# Patient Record
Sex: FEMALE | Race: WHITE | NOT HISPANIC OR LATINO | URBAN - METROPOLITAN AREA
[De-identification: names, ages, dates, MRNs, and addresses within clinical notes are randomized per-mention and may not be internally consistent; named-entity substitution may affect disease eponyms.]

---

## 2022-07-09 ENCOUNTER — TELEPHONE ENCOUNTER (OUTPATIENT)
Dept: URBAN - METROPOLITAN AREA CLINIC 121 | Facility: CLINIC | Age: 47
End: 2022-07-09

## 2022-07-09 RX ORDER — PANTOPRAZOLE SODIUM 40 MG/1
GRANULE, DELAYED RELEASE ORAL TWICE A DAY
Refills: 0 | OUTPATIENT
Start: 2015-05-07 | End: 2015-06-24

## 2022-07-09 RX ORDER — METRONIDAZOLE 500 MG/1
TAKE ONE TABLET PO THREE TIMES A DAY FOR 10 DAYS TABLET ORAL THREE TIMES A DAY
Refills: 0 | OUTPATIENT
Start: 2015-06-01 | End: 2015-06-24

## 2022-07-09 RX ORDER — LEVOTHYROXINE SODIUM 100 UG/1
TABLET ORAL ONCE A DAY
Refills: 0 | OUTPATIENT
Start: 2014-09-24 | End: 2015-05-07

## 2022-07-09 RX ORDER — TOPIRAMATE 25 MG
TABLET ORAL
Refills: 0 | OUTPATIENT
Start: 2015-02-26 | End: 2015-05-07

## 2022-07-09 RX ORDER — DEXTROAMPHETAMINE SACCHARATE, AMPHETAMINE ASPARTATE, DEXTROAMPHETAMINE SULFATE, AND AMPHETAMINE SULFATE 3.125; 3.125; 3.125; 3.125 MG/1; MG/1; MG/1; MG/1
TABLET ORAL ONCE A DAY
Refills: 0 | OUTPATIENT
Start: 2015-05-07 | End: 2015-05-20

## 2022-07-09 RX ORDER — LEVOTHYROXINE SODIUM 112 UG/1
TABLET ORAL ONCE A DAY
Refills: 0 | OUTPATIENT
Start: 2015-05-07 | End: 2015-06-24

## 2022-07-09 RX ORDER — DEXTROAMPHETAMINE SACCHARATE, AMPHETAMINE ASPARTATE, DEXTROAMPHETAMINE SULFATE, AND AMPHETAMINE SULFATE 7.5; 7.5; 7.5; 7.5 MG/1; MG/1; MG/1; MG/1
TABLET ORAL
Refills: 0 | OUTPATIENT
Start: 2015-02-26 | End: 2015-05-07

## 2022-07-09 RX ORDER — DEXTROAMPHETAMINE SULFATE, DEXTROAMPHETAMINE SACCHARATE, AMPHETAMINE SULFATE AND AMPHETAMINE ASPARTATE 2.5; 2.5; 2.5; 2.5 MG/1; MG/1; MG/1; MG/1
CAPSULE, EXTENDED RELEASE ORAL ONCE A DAY
Refills: 0 | OUTPATIENT
Start: 2015-05-20 | End: 2015-06-24

## 2022-07-09 RX ORDER — DICYCLOMINE HYDROCHLORIDE 20 MG/1
TAKE ONE TABLET PO TID TABLET ORAL THREE TIMES A DAY
Refills: 0 | OUTPATIENT
Start: 2015-05-07 | End: 2015-06-03

## 2022-07-09 RX ORDER — OMEPRAZOLE 40 MG/1
CAPSULE, DELAYED RELEASE ORAL ONCE A DAY
Refills: 0 | OUTPATIENT
Start: 2014-09-24 | End: 2015-05-07

## 2022-07-09 RX ORDER — ATORVASTATIN CALCIUM 10 MG/1
TABLET, FILM COATED ORAL ONCE A DAY
Refills: 0 | OUTPATIENT
Start: 2015-06-11 | End: 2015-06-24

## 2022-07-09 RX ORDER — OMEPRAZOLE 40 MG/1
TAKE ONE CAPSULE PO DAILY 30-60 MINUTES BEFORE BREAKFAST CAPSULE, DELAYED RELEASE ORAL TWICE A DAY
Refills: 0 | OUTPATIENT
Start: 2014-09-24 | End: 2014-10-15

## 2022-07-09 RX ORDER — PHENTERMINE HYDROCHLORIDE 37.5 MG/1
CAPSULE ORAL ONCE A DAY
Refills: 0 | OUTPATIENT
Start: 2014-09-24 | End: 2015-02-26

## 2022-07-10 ENCOUNTER — TELEPHONE ENCOUNTER (OUTPATIENT)
Dept: URBAN - METROPOLITAN AREA CLINIC 121 | Facility: CLINIC | Age: 47
End: 2022-07-10

## 2022-07-10 RX ORDER — PANTOPRAZOLE SODIUM 40 MG/1
GRANULE, DELAYED RELEASE ORAL TWICE A DAY
Refills: 0 | Status: ACTIVE | COMMUNITY
Start: 2015-06-24

## 2022-07-10 RX ORDER — ATORVASTATIN CALCIUM 10 MG/1
TABLET, FILM COATED ORAL ONCE A DAY
Refills: 0 | Status: ACTIVE | COMMUNITY
Start: 2015-06-24

## 2022-07-10 RX ORDER — DICYCLOMINE HYDROCHLORIDE 20 MG/1
THREE TIMES A DAY TABLET ORAL THREE TIMES A DAY
Refills: 3 | Status: ACTIVE | COMMUNITY
Start: 2015-06-03

## 2022-07-10 RX ORDER — DEXTROAMPHETAMINE SULFATE, DEXTROAMPHETAMINE SACCHARATE, AMPHETAMINE SULFATE AND AMPHETAMINE ASPARTATE 2.5; 2.5; 2.5; 2.5 MG/1; MG/1; MG/1; MG/1
CAPSULE, EXTENDED RELEASE ORAL ONCE A DAY
Refills: 0 | Status: ACTIVE | COMMUNITY
Start: 2015-06-24

## 2022-07-10 RX ORDER — LEVOTHYROXINE SODIUM 112 UG/1
TABLET ORAL ONCE A DAY
Refills: 0 | Status: ACTIVE | COMMUNITY
Start: 2015-06-24

## 2022-07-10 RX ORDER — SUCRALFATE 1 G/1
TAKE ONE TAB PO FOUR TIMES DAILY TABLET ORAL
Refills: 0 | Status: ACTIVE | COMMUNITY
Start: 2014-09-24

## 2022-07-10 RX ORDER — OMEPRAZOLE 40 MG/1
TAKE ONE CAPSULE BY MOUTH TWICE A DAY TAKE ONE 30 TO 60 MINUTES PRIOR TO BREAKFAST CAPSULE, DELAYED RELEASE ORAL TWICE A DAY
Refills: 1 | Status: ACTIVE | COMMUNITY
Start: 2014-10-28

## 2023-05-16 ENCOUNTER — OFFICE VISIT (OUTPATIENT)
Dept: URBAN - METROPOLITAN AREA CLINIC 60 | Facility: CLINIC | Age: 48
End: 2023-05-16

## 2023-05-21 PROBLEM — 235595009: Status: ACTIVE | Noted: 2023-05-21

## 2023-05-21 PROBLEM — 397881000: Status: ACTIVE | Noted: 2023-05-21

## 2023-05-22 ENCOUNTER — OFFICE VISIT (OUTPATIENT)
Dept: URBAN - METROPOLITAN AREA CLINIC 60 | Facility: CLINIC | Age: 48
End: 2023-05-22
Payer: COMMERCIAL

## 2023-05-22 ENCOUNTER — LAB OUTSIDE AN ENCOUNTER (OUTPATIENT)
Dept: URBAN - METROPOLITAN AREA CLINIC 60 | Facility: CLINIC | Age: 48
End: 2023-05-22

## 2023-05-22 ENCOUNTER — WEB ENCOUNTER (OUTPATIENT)
Dept: URBAN - METROPOLITAN AREA CLINIC 60 | Facility: CLINIC | Age: 48
End: 2023-05-22

## 2023-05-22 VITALS
DIASTOLIC BLOOD PRESSURE: 84 MMHG | SYSTOLIC BLOOD PRESSURE: 122 MMHG | OXYGEN SATURATION: 98 % | HEART RATE: 88 BPM | HEIGHT: 61 IN | TEMPERATURE: 97.6 F | BODY MASS INDEX: 30.02 KG/M2 | WEIGHT: 159 LBS

## 2023-05-22 DIAGNOSIS — R49.0 HOARSENESS: ICD-10-CM

## 2023-05-22 DIAGNOSIS — K57.90 DIVERTICULOSIS: ICD-10-CM

## 2023-05-22 DIAGNOSIS — K21.9 GASTROESOPHAGEAL REFLUX DISEASE, UNSPECIFIED WHETHER ESOPHAGITIS PRESENT: ICD-10-CM

## 2023-05-22 DIAGNOSIS — R10.32 LEFT LOWER QUADRANT ABDOMINAL PAIN: ICD-10-CM

## 2023-05-22 PROBLEM — 301716002: Status: ACTIVE | Noted: 2023-05-22

## 2023-05-22 PROBLEM — 50219008: Status: ACTIVE | Noted: 2023-05-22

## 2023-05-22 PROCEDURE — 99204 OFFICE O/P NEW MOD 45 MIN: CPT | Performed by: PHYSICIAN ASSISTANT

## 2023-05-22 RX ORDER — SPIRONOLACTONE 25 MG/1
TABLET ORAL
Qty: 90 TABLET | Status: ACTIVE | COMMUNITY

## 2023-05-22 RX ORDER — SEMAGLUTIDE 1.34 MG/ML
INJECTION, SOLUTION SUBCUTANEOUS
Qty: 3 MILLILITER | Status: ACTIVE | COMMUNITY

## 2023-05-22 RX ORDER — LIOTHYRONINE SODIUM 5 UG/1
TAKE 1 TABLET BY MOUTH EVERY DAY TABLET ORAL
Qty: 90 EACH | Refills: 1 | Status: ACTIVE | COMMUNITY

## 2023-05-22 RX ORDER — TOPIRAMATE 100 MG/1
TABLET, FILM COATED ORAL
Qty: 90 TABLET | Status: ACTIVE | COMMUNITY

## 2023-05-22 RX ORDER — DEXLANSOPRAZOLE 30 MG/1
TAKE 1 CAPSULE BY MOUTH EVERY DAY CAPSULE, DELAYED RELEASE ORAL
Qty: 30 EACH | Refills: 1 | Status: ACTIVE | COMMUNITY

## 2023-05-22 RX ORDER — MECLIZINE HYDROCHLORIDE 25 MG/1
TAKE 1 TABLET BY MOUTH THREE TIMES A DAY FOR 10 DAYS TABLET ORAL
Qty: 30 EACH | Refills: 0 | Status: ACTIVE | COMMUNITY

## 2023-05-22 RX ORDER — LEVOTHYROXINE SODIUM 112 UG/1
TABLET ORAL ONCE A DAY
Refills: 0 | Status: ACTIVE | COMMUNITY
Start: 2015-06-24

## 2023-05-22 RX ORDER — VALACYCLOVIR HYDROCHLORIDE 1 G/1
TABLET, FILM COATED ORAL
Qty: 20 TABLET | Status: ACTIVE | COMMUNITY

## 2023-05-22 RX ORDER — ALBUTEROL SULFATE 90 UG/1
INHALE 2 PUFFS EVERY 4 HOURS BY INHALATION ROUTE FOR 14 DAYS AEROSOL, METERED RESPIRATORY (INHALATION)
Qty: 18 GRAM | Refills: 0 | Status: ACTIVE | COMMUNITY

## 2023-05-22 RX ORDER — IBUPROFEN 800 MG/1
TABLET, FILM COATED ORAL
Qty: 30 TABLET | Status: ACTIVE | COMMUNITY

## 2023-05-22 NOTE — HPI-TODAY'S VISIT:
Niurka is a 48-year-old female who presents for evaluation of diverticulitis. Previous office visit with Kinga Lantigua on 6/24/2015 for complaint of epigastric pain.   ------Recent CT scan of the abdomen and pelvis with contrast performed on 4/6/2023 and compared to previous exam on 3/28/2022 showed diverticulosis of the sigmoid colon without diverticulitis, 4 x 4.5 cm fibroid nodule of the uterus, residual 1.5 cm left ovarian cyst.     ------Upper endoscopy 10/3/2014 (Dr. Hay) normal esophagus, chronic gastritis, examination otherwise normal.  Pathology: Duodenal mucosa with no significant abnormality, no evidence of gluten sensitive enteropathy, duodenitis, no evidence of gluten sensitive enteropathy, duodenitis, or Whipple's disease.  Negative for intestinal organisms.  Gastric antrum biopsy consistent with reactive gastropathy, no significant acute and chronic inflammation, negative for H. pylori.  GE junction biopsy showed esophageal squamous mucosa with reflux related change.  Glandular mucosa with chronic inflammation.  No intestinal metaplasia identified.  Negative for glandular dysplasia.  Negative for fungal organisms.  Small portion of unremarkable duodenal epithelium present. ------Colonoscopy 6/11/2015 (Dr. Hay): Nonbleeding internal hemorrhoids, normal mucosa in the entire examined colon. Pathology: Colonic mucosa with no significant abnormality, Next colon due in 10 years if no family history of colon cancer.   Today, she complains of  1.                losing her voice for the past 7 months, comes and goes.  +hoarseness, coughing, coughs until she gags and sometimes this makes her vomit. rescue inhaler helps, every 4 hours when her symptoms are worse. Cold milkshake brings on a cough Denies any dysphagia. On dexilant Saw Dr. Gale, ENT for scope. No pathology with her vocal cords. Was referred to speech therapy but they called her and did not recommend speech for her. Has autoimmune thyroid disease, hypothyroid, takes levothyroxine and T.     2.                Abdominal pain - none today. her left lower quadrant pain lasted until the steroid pack was done, resolved one week ago. Onset 2 weeks ago Location left lower quad Duration 1/1/2 weeks, better after taking steroid pack for 1 week. Character - had vomiting first day, lost 9 pounds in one day,  Aggravating factors - symptoms started after excessive intake of pistachio Relieving factors - was out of work for one week timing severity - was severe weight loss of 9 pounds first, then after steroids she gained it back night sweats - no fever - had a fever in the beginning, a couple days after beginning had low grade fever, 101. BM - regular blood - no Nausea -yes but resolved now vomiting - had it for a few days bloody stools - denies  NSAIDs none ETOH - none smoking - no marijuana use -no Omeprzaole - no takes dexilant, dexlansoprazole Protonix - no Dicyclomine -not in 10 years CPAP - no does not have sleep apnea does not drink soda drinks coffee eats acidic candy, sweetarts, hot tomales, nerds, sour patch kids "I'm an addict and eat them all day"

## 2023-06-01 ENCOUNTER — CLAIMS CREATED FROM THE CLAIM WINDOW (OUTPATIENT)
Dept: URBAN - METROPOLITAN AREA SURGERY CENTER 4 | Facility: SURGERY CENTER | Age: 48
End: 2023-06-01
Payer: COMMERCIAL

## 2023-06-01 ENCOUNTER — CLAIMS CREATED FROM THE CLAIM WINDOW (OUTPATIENT)
Dept: URBAN - METROPOLITAN AREA CLINIC 4 | Facility: CLINIC | Age: 48
End: 2023-06-01
Payer: COMMERCIAL

## 2023-06-01 DIAGNOSIS — R19.8 OTHER SPECIFIED SYMPTOMS AND SIGNS INVOLVING THE DIGESTIVE SYSTEM AND ABDOMEN: ICD-10-CM

## 2023-06-01 DIAGNOSIS — K29.60 EROSIVE GASTRITIS: ICD-10-CM

## 2023-06-01 DIAGNOSIS — K21.00 GASTRO-ESOPHAGEAL REFLUX DISEASE WITH ESOPHAGITIS, WITHOUT BLEEDING: ICD-10-CM

## 2023-06-01 DIAGNOSIS — K29.00 ACUTE GASTRITIS WITHOUT BLEEDING: ICD-10-CM

## 2023-06-01 DIAGNOSIS — K31.89 OTHER DISEASES OF STOMACH AND DUODENUM: ICD-10-CM

## 2023-06-01 DIAGNOSIS — R10.13 EPIGASTRIC PAIN: ICD-10-CM

## 2023-06-01 DIAGNOSIS — K20.80 ESOPHAGITIS, LOS ANGELES GRADE A: ICD-10-CM

## 2023-06-01 DIAGNOSIS — R10.13 ABDOMINAL PAIN, EPIGASTRIC: ICD-10-CM

## 2023-06-01 PROCEDURE — 43239 EGD BIOPSY SINGLE/MULTIPLE: CPT | Performed by: INTERNAL MEDICINE

## 2023-06-01 PROCEDURE — 00731 ANES UPR GI NDSC PX NOS: CPT | Performed by: NURSE ANESTHETIST, CERTIFIED REGISTERED

## 2023-06-01 PROCEDURE — 88312 SPECIAL STAINS GROUP 1: CPT | Performed by: PATHOLOGY

## 2023-06-01 PROCEDURE — 88305 TISSUE EXAM BY PATHOLOGIST: CPT | Performed by: PATHOLOGY

## 2023-06-01 RX ORDER — TOPIRAMATE 100 MG/1
TABLET, FILM COATED ORAL
Qty: 90 TABLET | COMMUNITY

## 2023-06-01 RX ORDER — LIOTHYRONINE SODIUM 5 UG/1
TAKE 1 TABLET BY MOUTH EVERY DAY TABLET ORAL
Qty: 90 EACH | Refills: 1 | COMMUNITY

## 2023-06-01 RX ORDER — ALBUTEROL SULFATE 90 UG/1
INHALE 2 PUFFS EVERY 4 HOURS BY INHALATION ROUTE FOR 14 DAYS AEROSOL, METERED RESPIRATORY (INHALATION)
Qty: 18 GRAM | Refills: 0 | COMMUNITY

## 2023-06-01 RX ORDER — SPIRONOLACTONE 25 MG/1
TABLET ORAL
Qty: 90 TABLET | COMMUNITY

## 2023-06-01 RX ORDER — VALACYCLOVIR HYDROCHLORIDE 1 G/1
TABLET, FILM COATED ORAL
Qty: 20 TABLET | COMMUNITY

## 2023-06-01 RX ORDER — IBUPROFEN 800 MG/1
TABLET, FILM COATED ORAL
Qty: 30 TABLET | COMMUNITY

## 2023-06-01 RX ORDER — MECLIZINE HYDROCHLORIDE 25 MG/1
TAKE 1 TABLET BY MOUTH THREE TIMES A DAY FOR 10 DAYS TABLET ORAL
Qty: 30 EACH | Refills: 0 | COMMUNITY

## 2023-06-01 RX ORDER — DEXLANSOPRAZOLE 30 MG/1
TAKE 1 CAPSULE BY MOUTH EVERY DAY CAPSULE, DELAYED RELEASE ORAL
Qty: 30 EACH | Refills: 1 | COMMUNITY

## 2023-06-01 RX ORDER — LEVOTHYROXINE SODIUM 112 UG/1
TABLET ORAL ONCE A DAY
Refills: 0 | COMMUNITY
Start: 2015-06-24

## 2023-06-01 RX ORDER — SEMAGLUTIDE 1.34 MG/ML
INJECTION, SOLUTION SUBCUTANEOUS
Qty: 3 MILLILITER | COMMUNITY

## 2023-06-15 ENCOUNTER — DASHBOARD ENCOUNTERS (OUTPATIENT)
Age: 48
End: 2023-06-15

## 2023-06-15 ENCOUNTER — OFFICE VISIT (OUTPATIENT)
Dept: URBAN - METROPOLITAN AREA CLINIC 63 | Facility: CLINIC | Age: 48
End: 2023-06-15
Payer: COMMERCIAL

## 2023-06-15 VITALS
TEMPERATURE: 97.9 F | SYSTOLIC BLOOD PRESSURE: 120 MMHG | HEIGHT: 61 IN | HEART RATE: 78 BPM | WEIGHT: 162.4 LBS | OXYGEN SATURATION: 96 % | DIASTOLIC BLOOD PRESSURE: 70 MMHG | BODY MASS INDEX: 30.66 KG/M2

## 2023-06-15 DIAGNOSIS — K29.00 ACUTE SUPERFICIAL GASTRITIS WITHOUT HEMORRHAGE: ICD-10-CM

## 2023-06-15 DIAGNOSIS — K44.9 HIATAL HERNIA: ICD-10-CM

## 2023-06-15 DIAGNOSIS — K21.00 GASTROESOPHAGEAL REFLUX DISEASE WITH ESOPHAGITIS WITHOUT HEMORRHAGE: ICD-10-CM

## 2023-06-15 PROBLEM — 266433003: Status: ACTIVE | Noted: 2023-06-15

## 2023-06-15 PROCEDURE — 99214 OFFICE O/P EST MOD 30 MIN: CPT | Performed by: PHYSICIAN ASSISTANT

## 2023-06-15 RX ORDER — DEXLANSOPRAZOLE 30 MG/1
TAKE 1 CAPSULE BY MOUTH EVERY DAY CAPSULE, DELAYED RELEASE ORAL
Qty: 30 EACH | Refills: 1 | Status: ACTIVE | COMMUNITY

## 2023-06-15 RX ORDER — LEVOTHYROXINE SODIUM 112 UG/1
TABLET ORAL ONCE A DAY
Refills: 0 | Status: ACTIVE | COMMUNITY
Start: 2015-06-24

## 2023-06-15 RX ORDER — SPIRONOLACTONE 25 MG/1
TABLET ORAL
Qty: 90 TABLET | Status: ACTIVE | COMMUNITY

## 2023-06-15 RX ORDER — TOPIRAMATE 100 MG/1
TABLET, FILM COATED ORAL
Qty: 90 TABLET | Status: ACTIVE | COMMUNITY

## 2023-06-15 RX ORDER — VALACYCLOVIR HYDROCHLORIDE 1 G/1
TABLET, FILM COATED ORAL
Qty: 20 TABLET | Status: ACTIVE | COMMUNITY

## 2023-06-15 RX ORDER — SEMAGLUTIDE 1.34 MG/ML
INJECTION, SOLUTION SUBCUTANEOUS
Qty: 3 MILLILITER | Status: ACTIVE | COMMUNITY

## 2023-06-15 RX ORDER — LIOTHYRONINE SODIUM 5 UG/1
TAKE 1 TABLET BY MOUTH EVERY DAY TABLET ORAL
Qty: 90 EACH | Refills: 1 | Status: ACTIVE | COMMUNITY

## 2023-06-15 RX ORDER — OMEPRAZOLE AND SODIUM BICARBONATE 40; 1100 MG/1; MG/1
1 CAPSULE ON AN EMPTY STOMACH CAPSULE ORAL ONCE A DAY
Qty: 30 | Refills: 3 | OUTPATIENT
Start: 2023-06-15

## 2023-06-15 RX ORDER — IBUPROFEN 800 MG/1
TABLET, FILM COATED ORAL
Qty: 30 TABLET | Status: ACTIVE | COMMUNITY

## 2023-06-15 RX ORDER — ALBUTEROL SULFATE 90 UG/1
INHALE 2 PUFFS EVERY 4 HOURS BY INHALATION ROUTE FOR 14 DAYS AEROSOL, METERED RESPIRATORY (INHALATION)
Qty: 18 GRAM | Refills: 0 | Status: ACTIVE | COMMUNITY

## 2023-06-15 RX ORDER — MECLIZINE HYDROCHLORIDE 25 MG/1
TAKE 1 TABLET BY MOUTH THREE TIMES A DAY FOR 10 DAYS TABLET ORAL
Qty: 30 EACH | Refills: 0 | Status: ACTIVE | COMMUNITY

## 2023-06-15 RX ORDER — DEXLANSOPRAZOLE 30 MG/1
TAKE 1 CAPSULE BY MOUTH EVERY DAY CAPSULE, DELAYED RELEASE ORAL
OUTPATIENT

## 2023-06-15 NOTE — HPI-TODAY'S VISIT:
Niurka is a 48-year-old female who presents for follow up of EGD.  6/1/23 - EGD, [facility],  -LA Grade A reflux esophagitis. Biopsied. - 1 cm hiatal hernia. - Acute erosive gastritis. Biopsied. - Normal duodenal bulb and second portion of the duodenum  Today we reviewed the EGD findings. Used diagrams to explain the anatomy of hiatal hernia. Reviewed GERD dietary and medical precautions in detail: NSAIDs none ETOH - none smoking - no marijuana use -no Omeprazole - no takes dexilant, dexlansoprazole She has tried omeprazole, pantoprazole and now dexilant and none are working as well for her as zegerid did in the past. She wants to switch to zegerid. does not drink soda or any carbonated beverages. drinks coffee, mostly cream with a splash of coffee, once a day. She used to eat sour candy all day and has now switched to circus peanuts.   Last colon 2015, [facility], no polyps, follow up in 10 years, due next year for repeat.   ------Recent CT scan of the abdomen and pelvis with contrast performed on 4/6/2023 and compared to previous exam on 3/28/2022 showed diverticulosis of the sigmoid colon without diverticulitis, 4 x 4.5 cm fibroid nodule of the uterus, residual 1.5 cm left ovarian cyst.     ------Upper endoscopy 10/3/2014 (Dr. Hay) normal esophagus, chronic gastritis, examination otherwise normal.  Pathology: Duodenal mucosa with no significant abnormality, no evidence of gluten sensitive enteropathy, duodenitis, no evidence of gluten sensitive enteropathy, duodenitis, or Whipple's disease.  Negative for intestinal organisms.  Gastric antrum biopsy consistent with reactive gastropathy, no significant acute and chronic inflammation, negative for H. pylori.  GE junction biopsy showed esophageal squamous mucosa with reflux related change.  Glandular mucosa with chronic inflammation.  No intestinal metaplasia identified.  Negative for glandular dysplasia.  Negative for fungal organisms.  Small portion of unremarkable duodenal epithelium present. ------Colonoscopy 6/11/2015 (Dr. Hay): Nonbleeding internal hemorrhoids, normal mucosa in the entire examined colon. Pathology: Colonic mucosa with no significant abnormality, Next colon due in 10 years if no family history of colon cancer.

## 2023-06-15 NOTE — PHYSICAL EXAM CHEST:
normal breath sounds, breathing is unlabored without accessory muscle use, chest wall non-tender Protopic Pregnancy And Lactation Text: This medication is Pregnancy Category C. It is unknown if this medication is excreted in breast milk when applied topically.

## 2023-06-20 ENCOUNTER — WEB ENCOUNTER (OUTPATIENT)
Dept: URBAN - METROPOLITAN AREA CLINIC 63 | Facility: CLINIC | Age: 48
End: 2023-06-20